# Patient Record
Sex: FEMALE | Race: BLACK OR AFRICAN AMERICAN | NOT HISPANIC OR LATINO | Employment: STUDENT | URBAN - METROPOLITAN AREA
[De-identification: names, ages, dates, MRNs, and addresses within clinical notes are randomized per-mention and may not be internally consistent; named-entity substitution may affect disease eponyms.]

---

## 2017-06-24 ENCOUNTER — OFFICE VISIT (OUTPATIENT)
Dept: URGENT CARE | Facility: URGENT CARE | Age: 22
End: 2017-06-24
Payer: COMMERCIAL

## 2017-06-24 VITALS
TEMPERATURE: 97.4 F | SYSTOLIC BLOOD PRESSURE: 95 MMHG | HEART RATE: 77 BPM | DIASTOLIC BLOOD PRESSURE: 61 MMHG | HEIGHT: 64 IN | WEIGHT: 150 LBS | OXYGEN SATURATION: 98 % | BODY MASS INDEX: 25.61 KG/M2

## 2017-06-24 DIAGNOSIS — L73.9 FOLLICULITIS: Primary | ICD-10-CM

## 2017-06-24 PROCEDURE — 99213 OFFICE O/P EST LOW 20 MIN: CPT | Performed by: FAMILY MEDICINE

## 2017-06-24 RX ORDER — CEPHALEXIN 500 MG/1
500 CAPSULE ORAL 3 TIMES DAILY
Qty: 30 CAPSULE | Refills: 0 | Status: SHIPPED | OUTPATIENT
Start: 2017-06-24 | End: 2017-08-23

## 2017-06-24 NOTE — MR AVS SNAPSHOT
"              After Visit Summary   2017    Tiarra Barrett    MRN: [id number]           Patient Information     Date Of Birth          1995        Visit Information        Provider Department      2017 7:55 PM Aaliyah Sinha MD [facility]        Today's Diagnoses     Folliculitis    -  1       Follow-ups after your visit        Follow-up notes from your care team     Return if symptoms worsen or fail to improve.      Who to contact     If you have questions or need follow up information about today's clinic visit or your schedule please contact [facility] directly at [phone].  Normal or non-critical lab and imaging results will be communicated to you by MyChart, letter or phone within 4 business days after the clinic has received the results. If you do not hear from us within 7 days, please contact the clinic through MyChart or phone. If you have a critical or abnormal lab result, we will notify you by phone as soon as possible.  Submit refill requests through [employer] or call your pharmacy and they will forward the refill request to us. Please allow 3 business days for your refill to be completed.          Additional Information About Your Visit        MyChart Information     Dom[employer] lets you send messages to your doctor, view your test results, renew your prescriptions, schedule appointments and more. To sign up, go to [email or web address] . Click on \"Log in\" on the left side of the screen, which will take you to the Welcome page. Then click on \"Sign up Now\" on the right side of the page.     You will be asked to enter the access code listed below, as well as some personal information. Please follow the directions to create your username and password.     Your access code is: [email or web address]  Expires: 2017  8:57 PM     Your access code will  in 90 days. If you need help or a new code, please call your [facility] clinic " "or [phone].        Care EveryWhere ID     This is your Care EveryWhere ID. This could be used by other organizations to access your [place] medical records  [id number]        Your Vitals Were     Pulse Temperature Height Last Period Pulse Oximetry Breastfeeding?    77 97.4  F (36.3  C) (Tympanic) 5' 4.02\" (1.626 m) 05/21/2017 98% No    BMI (Body Mass Index)                   25.73 kg/m2            Blood Pressure from Last 3 Encounters:   06/24/17 95/61   05/15/15 117/68   03/17/15 110/77    Weight from Last 3 Encounters:   06/24/17 150 lb (68 kg)   05/15/15 140 lb (63.5 kg)   03/16/15 157 lb (71.2 kg)              Today, you had the following     No orders found for display         Today's Medication Changes          These changes are accurate as of: 6/24/17  8:57 PM.  If you have any questions, ask your nurse or doctor.               Start taking these medicines.        Dose/Directions    cephALEXin 500 MG capsule   Commonly known as:  KEFLEX   Used for:  Folliculitis   Started by:  Aaliyah Sinha MD        Dose:  500 mg   Take 1 capsule (500 mg) by mouth 3 times daily   Quantity:  30 capsule   Refills:  0            Where to get your medicines      These medications were sent to [facility]/pharmacy #[id number] - [place], [place] - [address] AT [facility]  [address] [place] [address]    Hours:  24-hours Phone:  [phone]     cephALEXin 500 MG capsule                Primary Care Provider    Physician No Ref-Primary       No address on file        Equal Access to Services     [facility] CampusADRIANNE AH: Hadii saji alves Soherlinda, waaxda luqadaha, qaybta kaalmathomas bourgeois. So [facility] [phone].    ATENCIÓN: Si habla español, tiene a simpson disposición servicios gratuitos de asistencia lingüística. Llame al [phone].    We comply with applicable federal civil rights laws and [place] laws. We do not discriminate on the basis of " race, color, national origin, age, disability sex, sexual orientation or gender identity.            Thank you!     Thank you for choosing [facility]  for your care. Our goal is always to provide you with excellent care. Hearing back from our patients is one way we can continue to improve our services. Please take a few minutes to complete the written survey that you may receive in the mail after your visit with us. Thank you!             Your Updated Medication List - Protect others around you: Learn how to safely use, store and throw away your medicines at www.disposemymeds.org.          This list is accurate as of: 6/24/17  8:57 PM.  Always use your most recent med list.                   Brand Name Dispense Instructions for use Diagnosis    cephALEXin 500 MG capsule    KEFLEX    30 capsule    Take 1 capsule (500 mg) by mouth 3 times daily    Folliculitis

## 2017-06-25 NOTE — PROGRESS NOTES
"History of Present Illness:  Patient is a 22 year old female who presents with history of recurrent folliculitis in pubic area which typically has been self-limiting for patient after a few hot showers.  She feels the small tender bumps and then they will resolve without further intervention.  Most recently she has noted one bump in pubic area just LEFT of center that is not resolving.  It is tender to touch, will not express anything when squeezed and continues to enlarge.  Patient shares her pubic hair regularly.    ROS:  General: Denies any fever or chills  HEENT: Denies eye pain, ear pain, throat pain or runny nose  NECK: Denies neck pain  LUNGS: Denies cough or SOB  CV: Denies chest pain  Abdomen: Denies abdominal pain, denies change in stool  SKIN: Denies rash.  NEURO: Denies dizziness  MUSCULOSKELETAL: Denies any ROM issues, no mylagias, no pain  PSYCH: Denies mood change    Objective:  BP 95/61  Pulse 77  Temp 97.4  F (36.3  C) (Tympanic)  Ht 5' 4.02\" (1.626 m)  Wt 150 lb (68 kg)  LMP 05/21/2017  SpO2 98%  Breastfeeding? No  BMI 25.73 kg/m2    General:  Patient is alert.  In NAD.  HEENT: NC/AT, PERRL, EOMI, TMs clear, external canals patent without cerumen, nasal mucosa moist, no rhinorrhea, no sinus tenderness on palpation, oropharynx moist without exudate or erythema  NECK: supple, no LAD  LUNGS: CTA bilaterally, no rhonchi, wheezes or rales  CV: RRR, no murmurs, rubs or gallops  ABDOMEN: Soft, NT/ND, +BS  BACK: No flank tenderness  : One small 2 cm superficial area in mid central pubic area just left of center, tender to palp just below skin consistent with a folliculitis, no head.  PSYCH: Normal mood and affect    Assessment:  Folliculitis    Plan:  Recommend hot packs to area.  Prescribed Keflex 500mg po tid x 10 days.  Discussed care when shaving pubic area to prevent recurrence.  FU if no change or worsening symptoms prn.    All questions were answered.  Patient voices understanding of the " plan at time of discharge.

## 2017-06-25 NOTE — NURSING NOTE
"Chief Complaint   Patient presents with     Urgent Care     Cyst     c/o cyst for 2 months       Initial BP 95/61  Pulse 77  Temp 97.4  F (36.3  C) (Tympanic)  Ht 5' 4.02\" (1.626 m)  Wt 150 lb (68 kg)  LMP 05/21/2017  SpO2 98%  Breastfeeding? No  BMI 25.73 kg/m2 Estimated body mass index is 25.73 kg/(m^2) as calculated from the following:    Height as of this encounter: 5' 4.02\" (1.626 m).    Weight as of this encounter: 150 lb (68 kg).  Medication Reconciliation: complete   Chikis Charles MA    "

## 2017-08-23 ENCOUNTER — OFFICE VISIT (OUTPATIENT)
Dept: URGENT CARE | Facility: URGENT CARE | Age: 22
End: 2017-08-23
Payer: COMMERCIAL

## 2017-08-23 VITALS
HEIGHT: 64 IN | TEMPERATURE: 99.1 F | BODY MASS INDEX: 25.61 KG/M2 | HEART RATE: 70 BPM | DIASTOLIC BLOOD PRESSURE: 62 MMHG | WEIGHT: 150 LBS | SYSTOLIC BLOOD PRESSURE: 108 MMHG

## 2017-08-23 DIAGNOSIS — N92.6 MISSED PERIOD: Primary | ICD-10-CM

## 2017-08-23 LAB — BETA HCG QUAL IFA URINE: NEGATIVE

## 2017-08-23 PROCEDURE — 84703 CHORIONIC GONADOTROPIN ASSAY: CPT | Performed by: INTERNAL MEDICINE

## 2017-08-23 PROCEDURE — 99213 OFFICE O/P EST LOW 20 MIN: CPT | Performed by: FAMILY MEDICINE

## 2017-08-23 NOTE — MR AVS SNAPSHOT
"              After Visit Summary   2017    Tiarra Barrett    MRN: [id number]           Patient Information     Date Of Birth          1995        Visit Information        Provider Department      2017 8:20 PM Aaliyah Sinha MD [facility]        Today's Diagnoses     Missed period    -  1       Follow-ups after your visit        Follow-up notes from your care team     Return if symptoms worsen or fail to improve.      Who to contact     If you have questions or need follow up information about today's clinic visit or your schedule please contact [facility] directly at [phone].  Normal or non-critical lab and imaging results will be communicated to you by MyChart, letter or phone within 4 business days after the clinic has received the results. If you do not hear from us within 7 days, please contact the clinic through MyChart or phone. If you have a critical or abnormal lab result, we will notify you by phone as soon as possible.  Submit refill requests through [employer] or call your pharmacy and they will forward the refill request to us. Please allow 3 business days for your refill to be completed.          Additional Information About Your Visit        MyChart Information     [employer] lets you send messages to your doctor, view your test results, renew your prescriptions, schedule appointments and more. To sign up, go to [email or web address] . Click on \"Log in\" on the left side of the screen, which will take you to the Welcome page. Then click on \"Sign up Now\" on the right side of the page.     You will be asked to enter the access code listed below, as well as some personal information. Please follow the directions to create your username and password.     Your access code is: [email or web address]  Expires: 2017  8:57 PM     Your access code will  in 90 days. If you need help or a new code, please call your [place] clinic " "or [phone].        Care EveryWhere ID     This is your Care EveryWhere ID. This could be used by other organizations to access your [place] medical records  [id number]        Your Vitals Were     Pulse Temperature Height Last Period Breastfeeding? BMI (Body Mass Index)    70 99.1  F (37.3  C) (Oral) 5' 4\" (1.626 m) 07/12/2017 No 25.75 kg/m2       Blood Pressure from Last 3 Encounters:   08/23/17 108/62   06/24/17 95/61   05/15/15 117/68    Weight from Last 3 Encounters:   08/23/17 150 lb (68 kg)   06/24/17 150 lb (68 kg)   05/15/15 140 lb (63.5 kg)              We Performed the Following     Beta HCG qual IFA urine          Today's Medication Changes          These changes are accurate as of: 8/23/17  8:38 PM.  If you have any questions, ask your nurse or doctor.               Stop taking these medicines if you haven't already. Please contact your care team if you have questions.     cephALEXin 500 MG capsule   Commonly known as:  KEFLEX   Stopped by:  Aaliyah Sinha MD                    Primary Care Provider    Physician No Ref-Primary       No address on file        Equal Access to Services     [facility] AH: Grazyna alves Soherlinda, wavidhida joseph, qaybta kaalmada ademarisa, thomas fischer. So [facility] [phone].    ATENCIÓN: Si habla español, tiene a simpson disposición servicios gratuitos de asistencia lingüística. Llame al [phone].    We comply with applicable federal civil rights laws and [place] laws. We do not discriminate on the basis of race, color, national origin, age, disability sex, sexual orientation or gender identity.            Thank you!     Thank you for choosing [facility]  for your care. Our goal is always to provide you with excellent care. Hearing back from our patients is one way we can continue to improve our services. Please take a few minutes to complete the written survey that you may receive in the " mail after your visit with us. Thank you!             Your Updated Medication List - Protect others around you: Learn how to safely use, store and throw away your medicines at www.disposemymeds.org.      Notice  As of 8/23/2017  8:38 PM    You have not been prescribed any medications.

## 2017-08-24 NOTE — NURSING NOTE
"Chief Complaint   Patient presents with     Urgent Care     Pregnancy Test     late for period about 2 weeks but is irregular. Breasts are painful and larger.        Initial /62  Pulse 70  Temp 99.1  F (37.3  C) (Oral)  Ht 5' 4\" (1.626 m)  Wt 150 lb (68 kg)  LMP 07/12/2017  Breastfeeding? No  BMI 25.75 kg/m2 Estimated body mass index is 25.75 kg/(m^2) as calculated from the following:    Height as of this encounter: 5' 4\" (1.626 m).    Weight as of this encounter: 150 lb (68 kg).  Medication Reconciliation: complete  "

## 2017-08-24 NOTE — PROGRESS NOTES
"History of Present Illness:  Patient is a 22 year old female who presents with her boyfriend with chief complaint of late menses.  Period is approximately 2 weeks late.  Has history of menstrual irregularity.  Is sexually active.  Notes breasts feel tender and enlarged.  No N/V.    ROS:  General: Denies any fever or chills  HEENT: Denies eye pain, ear pain, throat pain or runny nose  NECK: Denies neck pain  LUNGS: Denies cough or SOB  CV: Denies chest pain  Abdomen: Denies abdominal pain, denies change in stool  SKIN: Denies rash.  NEURO: Denies dizziness  MUSCULOSKELETAL: Denies any ROM issues, no mylagias, no pain  PSYCH: Denies mood change    Objective:  /62  Pulse 70  Temp 99.1  F (37.3  C) (Oral)  Ht 5' 4\" (1.626 m)  Wt 150 lb (68 kg)  LMP 07/12/2017  Breastfeeding? No  BMI 25.75 kg/m2    General:  Patient is alert.  In NAD.  HEENT: NC/AT, PERRL, EOMI  NECK: supple, no LAD  MUSCULOSKELETAL: No deficits noted.  Normal strength and ROM in BUEs and BLEs,  PSYCH: Normal mood and affect    UPT negative    Assessment:  Irregular menses.    Plan:  Patient given test results today.  Advised FU and repeat testing if continues without menses another 4 weeks prn    All questions were answered.  Patient voices understanding of the plan at time of discharge.  "